# Patient Record
Sex: MALE | Race: WHITE | URBAN - METROPOLITAN AREA
[De-identification: names, ages, dates, MRNs, and addresses within clinical notes are randomized per-mention and may not be internally consistent; named-entity substitution may affect disease eponyms.]

---

## 2023-04-19 ENCOUNTER — OFFICE VISIT (OUTPATIENT)
Facility: LOCATION | Age: 73
End: 2023-04-19
Payer: COMMERCIAL

## 2023-04-19 DIAGNOSIS — H25.13 AGE-RELATED NUCLEAR CATARACT, BILATERAL: ICD-10-CM

## 2023-04-19 DIAGNOSIS — H40.1131 PRIMARY OPEN-ANGLE GLAUCOMA BILATERAL MILD STAGE: Primary | ICD-10-CM

## 2023-04-19 PROCEDURE — 92083 EXTENDED VISUAL FIELD XM: CPT | Performed by: STUDENT IN AN ORGANIZED HEALTH CARE EDUCATION/TRAINING PROGRAM

## 2023-04-19 PROCEDURE — 99214 OFFICE O/P EST MOD 30 MIN: CPT | Performed by: STUDENT IN AN ORGANIZED HEALTH CARE EDUCATION/TRAINING PROGRAM

## 2023-04-19 RX ORDER — LATANOPROST 50 UG/ML
0.005 % SOLUTION OPHTHALMIC
Qty: 3 | Refills: 4 | Status: ACTIVE
Start: 2023-04-19

## 2023-04-19 RX ORDER — BIMATOPROST 0.1 MG/ML
0.01 % SOLUTION/ DROPS OPHTHALMIC
Qty: 2.5 | Refills: 11 | Status: ACTIVE
Start: 2023-04-19

## 2023-04-19 ASSESSMENT — INTRAOCULAR PRESSURE
OD: 11
OS: 11
OD: 12
OS: 13

## 2023-04-19 NOTE — IMPRESSION/PLAN
Impression: Examination revealed mild nuclear cataract. NVS. No surgery indicated at this time. Plan: Will revisit once patient is visually bothered by cataracts.

## 2023-04-19 NOTE — IMPRESSION/PLAN
Impression: CC: Patient presents today for 6 month f/u w/ HVF 24-2 OU and DFE OU POHx: POAG Mild OU, Cataract OU. FOHx: Negative to glaucoma. PMHx: HCL, Prostate cancer. Social Hx: Living with friend, Employed , (-) tobacco use, (-) alcohol use, (-) street drug use Eye med: Latanoprost QHS OU Plan: Testing:
OCT/ONH 9/2022: OD bdl thin IT, bdl thin ST, OS thin ST/IN. OU stable. HVF 24-2 03/2022: OU normal. OD improved, OS stable. HVF 24-2 04/2023: NO specific depression, stable OU Pachy: 526/522. Gonio 10/2021: SS 1+ 360 OU. TMAX: Mid teens OU. Target IOP: < 14 OU. Today: 
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 Will continue to monitor with current treatment plan. 
_____________________________________ Continue Latanoprost QHS OU

RTC in 6 months w/ OCT/ONH OU and gonio w/ Dr. Chris Tejeda.

## 2023-10-13 ENCOUNTER — OFFICE VISIT (OUTPATIENT)
Facility: LOCATION | Age: 73
End: 2023-10-13
Payer: COMMERCIAL

## 2023-10-13 DIAGNOSIS — H04.123 DRY EYE SYNDROME OF BILATERAL LACRIMAL GLANDS: ICD-10-CM

## 2023-10-13 DIAGNOSIS — H40.1131 PRIMARY OPEN-ANGLE GLAUCOMA BILATERAL MILD STAGE: Primary | ICD-10-CM

## 2023-10-13 DIAGNOSIS — H25.13 AGE-RELATED NUCLEAR CATARACT, BILATERAL: ICD-10-CM

## 2023-10-13 PROCEDURE — 92133 CPTRZD OPH DX IMG PST SGM ON: CPT | Performed by: STUDENT IN AN ORGANIZED HEALTH CARE EDUCATION/TRAINING PROGRAM

## 2023-10-13 PROCEDURE — 99214 OFFICE O/P EST MOD 30 MIN: CPT | Performed by: STUDENT IN AN ORGANIZED HEALTH CARE EDUCATION/TRAINING PROGRAM

## 2023-10-13 PROCEDURE — 92020 GONIOSCOPY: CPT | Performed by: STUDENT IN AN ORGANIZED HEALTH CARE EDUCATION/TRAINING PROGRAM

## 2023-10-13 ASSESSMENT — INTRAOCULAR PRESSURE
OD: 11
OD: 10
OS: 12
OS: 10

## 2024-04-12 ENCOUNTER — OFFICE VISIT (OUTPATIENT)
Facility: LOCATION | Age: 74
End: 2024-04-12
Payer: COMMERCIAL

## 2024-04-12 DIAGNOSIS — H25.13 AGE-RELATED NUCLEAR CATARACT, BILATERAL: ICD-10-CM

## 2024-04-12 DIAGNOSIS — H04.123 DRY EYE SYNDROME OF BILATERAL LACRIMAL GLANDS: ICD-10-CM

## 2024-04-12 DIAGNOSIS — H40.1131 PRIMARY OPEN-ANGLE GLAUCOMA BILATERAL MILD STAGE: Primary | ICD-10-CM

## 2024-04-12 PROCEDURE — 99213 OFFICE O/P EST LOW 20 MIN: CPT | Performed by: STUDENT IN AN ORGANIZED HEALTH CARE EDUCATION/TRAINING PROGRAM

## 2024-04-12 PROCEDURE — 92133 CPTRZD OPH DX IMG PST SGM ON: CPT | Performed by: STUDENT IN AN ORGANIZED HEALTH CARE EDUCATION/TRAINING PROGRAM

## 2024-04-12 RX ORDER — LATANOPROST 50 UG/ML
0.005 % SOLUTION OPHTHALMIC
Qty: 3 | Refills: 11 | Status: ACTIVE
Start: 2024-04-12

## 2024-04-12 ASSESSMENT — INTRAOCULAR PRESSURE
OD: 10
OD: 11
OS: 11

## 2024-08-09 ENCOUNTER — OFFICE VISIT (OUTPATIENT)
Facility: LOCATION | Age: 74
End: 2024-08-09
Payer: COMMERCIAL

## 2024-08-09 DIAGNOSIS — H04.123 DRY EYE SYNDROME OF BILATERAL LACRIMAL GLANDS: ICD-10-CM

## 2024-08-09 DIAGNOSIS — H40.1131 PRIMARY OPEN-ANGLE GLAUCOMA BILATERAL MILD STAGE: Primary | ICD-10-CM

## 2024-08-09 DIAGNOSIS — H25.13 AGE-RELATED NUCLEAR CATARACT, BILATERAL: ICD-10-CM

## 2024-08-09 PROCEDURE — 92083 EXTENDED VISUAL FIELD XM: CPT | Performed by: STUDENT IN AN ORGANIZED HEALTH CARE EDUCATION/TRAINING PROGRAM

## 2024-08-09 PROCEDURE — 99213 OFFICE O/P EST LOW 20 MIN: CPT | Performed by: STUDENT IN AN ORGANIZED HEALTH CARE EDUCATION/TRAINING PROGRAM

## 2024-08-09 ASSESSMENT — INTRAOCULAR PRESSURE
OD: 11
OS: 11

## 2025-02-11 ENCOUNTER — OFFICE VISIT (OUTPATIENT)
Facility: LOCATION | Age: 75
End: 2025-02-11
Payer: COMMERCIAL

## 2025-02-11 DIAGNOSIS — H40.1131 PRIMARY OPEN-ANGLE GLAUCOMA BILATERAL MILD STAGE: Primary | ICD-10-CM

## 2025-02-11 DIAGNOSIS — H04.123 DRY EYE SYNDROME OF BILATERAL LACRIMAL GLANDS: ICD-10-CM

## 2025-02-11 DIAGNOSIS — H25.13 AGE-RELATED NUCLEAR CATARACT, BILATERAL: ICD-10-CM

## 2025-02-11 PROCEDURE — 92133 CPTRZD OPH DX IMG PST SGM ON: CPT | Performed by: STUDENT IN AN ORGANIZED HEALTH CARE EDUCATION/TRAINING PROGRAM

## 2025-02-11 PROCEDURE — 99214 OFFICE O/P EST MOD 30 MIN: CPT | Performed by: STUDENT IN AN ORGANIZED HEALTH CARE EDUCATION/TRAINING PROGRAM

## 2025-02-11 ASSESSMENT — INTRAOCULAR PRESSURE
OD: 11
OS: 11
OD: 12
OS: 12

## 2025-08-19 ENCOUNTER — OFFICE VISIT (OUTPATIENT)
Facility: LOCATION | Age: 75
End: 2025-08-19
Payer: COMMERCIAL

## 2025-08-19 DIAGNOSIS — H04.123 DRY EYE SYNDROME OF BILATERAL LACRIMAL GLANDS: ICD-10-CM

## 2025-08-19 DIAGNOSIS — H40.1131 PRIMARY OPEN-ANGLE GLAUCOMA BILATERAL MILD STAGE: Primary | ICD-10-CM

## 2025-08-19 DIAGNOSIS — H25.13 AGE-RELATED NUCLEAR CATARACT, BILATERAL: ICD-10-CM

## 2025-08-19 PROCEDURE — 92083 EXTENDED VISUAL FIELD XM: CPT | Performed by: STUDENT IN AN ORGANIZED HEALTH CARE EDUCATION/TRAINING PROGRAM

## 2025-08-19 PROCEDURE — 99214 OFFICE O/P EST MOD 30 MIN: CPT | Performed by: STUDENT IN AN ORGANIZED HEALTH CARE EDUCATION/TRAINING PROGRAM

## 2025-08-19 ASSESSMENT — INTRAOCULAR PRESSURE
OS: 11
OD: 12
OD: 14